# Patient Record
Sex: FEMALE
[De-identification: names, ages, dates, MRNs, and addresses within clinical notes are randomized per-mention and may not be internally consistent; named-entity substitution may affect disease eponyms.]

---

## 2022-05-15 ENCOUNTER — HOSPITAL ENCOUNTER (EMERGENCY)
Dept: HOSPITAL 5 - ED | Age: 47
Discharge: HOME | End: 2022-05-15
Payer: COMMERCIAL

## 2022-05-15 VITALS — SYSTOLIC BLOOD PRESSURE: 122 MMHG | DIASTOLIC BLOOD PRESSURE: 75 MMHG

## 2022-05-15 DIAGNOSIS — E11.9: ICD-10-CM

## 2022-05-15 DIAGNOSIS — R10.2: ICD-10-CM

## 2022-05-15 DIAGNOSIS — M79.652: Primary | ICD-10-CM
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
## 2022-05-15 DIAGNOSIS — M25.562: ICD-10-CM
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
## 2022-05-15 DIAGNOSIS — Z98.890: ICD-10-CM
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
## 2022-05-15 DIAGNOSIS — E66.01: ICD-10-CM

## 2022-05-15 PROCEDURE — 73700 CT LOWER EXTREMITY W/O DYE: CPT

## 2022-05-15 PROCEDURE — 96375 TX/PRO/DX INJ NEW DRUG ADDON: CPT

## 2022-05-15 PROCEDURE — 99285 EMERGENCY DEPT VISIT HI MDM: CPT

## 2022-05-15 PROCEDURE — 72170 X-RAY EXAM OF PELVIS: CPT

## 2022-05-15 PROCEDURE — 99284 EMERGENCY DEPT VISIT MOD MDM: CPT

## 2022-05-15 PROCEDURE — 72192 CT PELVIS W/O DYE: CPT

## 2022-05-15 PROCEDURE — 73552 X-RAY EXAM OF FEMUR 2/>: CPT

## 2022-05-15 PROCEDURE — 84703 CHORIONIC GONADOTROPIN ASSAY: CPT

## 2022-05-15 PROCEDURE — 36415 COLL VENOUS BLD VENIPUNCTURE: CPT

## 2022-05-15 PROCEDURE — 96374 THER/PROPH/DIAG INJ IV PUSH: CPT

## 2022-05-15 PROCEDURE — 73562 X-RAY EXAM OF KNEE 3: CPT

## 2022-05-15 NOTE — CAT SCAN REPORT
CT lower extremity LT wo con



INDICATION:

L hip and knee pain; unable to bear weight  PT DID HAVE A FALL.



TECHNIQUE:

All CT scans at this location are performed using CT dose reduction for ALARA by means of automated e
xposure control. 





COMPARISON:

Left knee x-ray 5/15/2022



FINDINGS:

No fracture, dislocation or joint effusion is seen within the left knee. Joint spaces well-preserved.
 Moderate prepatellar soft tissue swelling/bruising is noted.



IMPRESSION:

1. No fracture left knee

2. Prepatellar soft tissue bruising



Signer Name: Cooper Stone MD 

Signed: 5/15/2022 8:23 AM

Workstation Name: [id number]

## 2022-05-15 NOTE — XRAY REPORT
XR knee 3V LT, XR femur 2+V LT



INDICATION / CLINICAL INFORMATION:

pain.



COMPARISON: 

None available.



FINDINGS:

Left femur: No acute fracture or malalignment. No focal soft tissue abnormality.



Left knee: No acute fracture or malalignment. No significant joint effusion. Nonspecific soft tissue 
swelling of the medial knee.



Signer Name: Mike Ledesma MD 

Signed: 5/15/2022 5:50 AM

Workstation Name: [email or web address]

## 2022-05-15 NOTE — EMERGENCY DEPARTMENT REPORT
ED General Adult HPI





- General


Chief complaint: Extremity Injury, Lower


Stated complaint: BROKEN LEG


Time Seen by Provider: 05/15/22 03:05


Source: patient, family


Mode of arrival: Ambulatory


Limitations: No Limitations





- History of Present Illness


Initial comments: 





patient presents s/p fall. States she tripped on a slippery floor and landed on 

her left side. denies hitting her head. Now complaining of pain in her L thigh 

and L knee. Denies numbness, weakness, LOC, HA, neck pain, CP, back pain, abd 

pain. 


Severity scale (0 -10): 10





- Related Data


                                    Allergies











Allergy/AdvReac Type Severity Reaction Status Date / Time


 


No Known Allergies Allergy   Unverified 05/15/22 03:05














ED Review of Systems


ROS: 


Stated complaint: BROKEN LEG


Other details as noted in HPI





Comment: All other systems reviewed and negative


Constitutional: denies: chills, fever





ED Past Medical Hx





- Past Medical History


Previous Medical History?: Yes


Hx Diabetes: Yes


Additional medical history: Morbid Obesity





- Surgical History


Past Surgical History?: Yes


Hx Cholecystectomy: Yes


Additional Surgical History: Friboidectomies





- Social History


Smoking Status: Never Smoker


Substance Use Type: None





ED Physical Exam





- General


Limitations: No Limitations


General appearance: alert, in no apparent distress





- Head


Head exam: Present: atraumatic, normocephalic





- Eye


Eye exam: Present: PERRL, EOMI





- ENT


ENT exam: Present: mucous membranes moist, other (airway patent)





- Neck


Neck exam: Present: other (painless full ROM; non tender; mechanism of injury 

not suggestive of cervical injury)





- Respiratory


Respiratory exam: Present: other (good air entyry, nml I:E, CTAB, no use of 

MIR)





- Cardiovascular


Cardiovascular Exam: Present: regular rate.  Absent: rubs, gallop





- GI/Abdominal


GI/Abdominal exam: Present: soft, normal bowel sounds.  Absent: distended, 

tenderness





- Extremities Exam


Extremities exam: Present: other (ecchymosis in anterior L knee; tenderness to 

palpation without deformity along L thigh and in anterior L knee; otherwise full

ROM without tenderness or deformity in all other extremities, including L shin; 

DP pulses 2+ bilaterally and equal; pelvis stable)





- Back Exam


Back exam: Present: other (no step offs).  Absent: paraspinal tenderness, 

vertebral tenderness





- Neurological Exam


Neurological exam: Present: alert, oriented X3, CN II-XII intact, other (GCS 

15/15 ([id number])).  Absent: motor sensory deficit





- Skin


Skin exam: Present: other (see extremities)





ED Course


                                   Vital Signs











  05/15/22





  02:59


 


Temperature 98 F


 


Pulse Rate 91 H


 


Respiratory 18





Rate 


 


Blood Pressure 136/94


 


O2 Sat by Pulse 98





Oximetry 














ED Medical Decision Making





- Lab Data








                                Laboratory Tests











  05/15/22





  04:59


 


HCG, Qual  Negative








XR pelvis: no fracture or dislocation


XR L femur: no fracture or dislocation


XR L knee: no fracture or dislocation











- Medical Decision Making





Patient received morphine 4 mg IV x 1, zofran 4 mg IV x 1. Pain improved, but 

patient unable to bear weight or ambulate deespite neg X-rays.





CT pelvis and LLE ordered to definitively rule out occult fractures. 


Critical care attestation.: 


If time is entered above; I have spent that time in minutes in the direct care 

of this critically ill patient, excluding procedure time.








ED Disposition


Clinical Impression: 


 Fall, Left knee pain, Left thigh pain





Disposition: 30 STILL A PATIENT


Is pt being admited?: No


Does the pt Need Aspirin: No


Condition: Stable


Time of Disposition: 06:24 (Patient care transferred to Dr. Randhawa (oncoming ER

doc). sig out was given by me to him. )

## 2022-05-15 NOTE — CAT SCAN REPORT
CT PELVIS WITHOUT CONTRAST



INDICATION / CLINICAL INFORMATION:

L hip pain; unable to bear weight.



TECHNIQUE:

Axial CT images were obtained through the pelvis without contrast. All CT scans at this location are 
performed using CT dose reduction for ALARA by means of automated exposure control. 



COMPARISON:

None available.



FINDINGS:



BONES: No fracture. No osseous lesion.



HIP JOINTS: No significant abnormality.



SACROILIAC JOINTS: No significant abnormality.



SOFT TISSUES: No significant abnormality.



LOWER LUMBAR SPINE: No significant abnormality.



SOFT TISSUES WITHIN PELVIS: No acute findings. Intrauterine IUD



ADDITIONAL FINDINGS: None.



IMPRESSION:

1. No significant abnormality.



Signer Name: Cooper Stone MD 

Signed: 5/15/2022 8:06 AM

Workstation Name: [id number]

## 2022-05-15 NOTE — XRAY REPORT
EXAMINATION: XR pelvis 1-2V,



INDICATION / CLINICAL INFORMATION: pain



COMPARISON: None available.

 

FINDINGS:



BONES / JOINT(S): No acute fracture or subluxation.



SOFT TISSUES: No significant abnormality.



ADDITIONAL FINDINGS: None.



IMPRESSION:

No acute process.



Signer Name: Mike Ledesma MD 

Signed: 5/15/2022 5:45 AM

Workstation Name: [email or web address]

## 2022-05-15 NOTE — XRAY REPORT
XR knee 3V LT, XR femur 2+V LT



INDICATION / CLINICAL INFORMATION:

pain.



COMPARISON: 

None available.



FINDINGS:

Left femur: No acute fracture or malalignment. No focal soft tissue abnormality.



Left knee: No acute fracture or malalignment. No significant joint effusion. Nonspecific soft tissue 
swelling of the medial knee.



Signer Name: Mike Ledesma MD 

Signed: 5/15/2022 5:50 AM

Workstation Name: [email or web address]